# Patient Record
Sex: MALE | Race: WHITE | NOT HISPANIC OR LATINO | Employment: FULL TIME | URBAN - NONMETROPOLITAN AREA
[De-identification: names, ages, dates, MRNs, and addresses within clinical notes are randomized per-mention and may not be internally consistent; named-entity substitution may affect disease eponyms.]

---

## 2017-10-03 ENCOUNTER — TELEMEDICINE2 (OUTPATIENT)
Dept: MEDICAL GROUP | Facility: PHYSICIAN GROUP | Age: 67
End: 2017-10-03
Payer: MEDICARE

## 2017-10-03 ENCOUNTER — TELEMEDICINE ORIGINATING SITE VISIT (OUTPATIENT)
Dept: MEDICAL GROUP | Facility: CLINIC | Age: 67
End: 2017-10-03
Payer: MEDICARE

## 2017-10-03 ENCOUNTER — NON-PROVIDER VISIT (OUTPATIENT)
Dept: MEDICAL GROUP | Facility: CLINIC | Age: 67
End: 2017-10-03
Payer: MEDICARE

## 2017-10-03 VITALS
SYSTOLIC BLOOD PRESSURE: 156 MMHG | HEART RATE: 70 BPM | HEIGHT: 70 IN | RESPIRATION RATE: 16 BRPM | TEMPERATURE: 98.5 F | DIASTOLIC BLOOD PRESSURE: 88 MMHG | BODY MASS INDEX: 23.91 KG/M2 | WEIGHT: 167 LBS | OXYGEN SATURATION: 96 %

## 2017-10-03 DIAGNOSIS — N39.0 URINARY TRACT INFECTION WITH HEMATURIA, SITE UNSPECIFIED: ICD-10-CM

## 2017-10-03 DIAGNOSIS — R35.0 URINARY FREQUENCY: ICD-10-CM

## 2017-10-03 DIAGNOSIS — Z11.59 NEED FOR HEPATITIS C SCREENING TEST: ICD-10-CM

## 2017-10-03 DIAGNOSIS — M54.50 CHRONIC BILATERAL LOW BACK PAIN WITHOUT SCIATICA: ICD-10-CM

## 2017-10-03 DIAGNOSIS — G89.29 CHRONIC BILATERAL LOW BACK PAIN WITHOUT SCIATICA: ICD-10-CM

## 2017-10-03 DIAGNOSIS — R31.9 URINARY TRACT INFECTION WITH HEMATURIA, SITE UNSPECIFIED: ICD-10-CM

## 2017-10-03 DIAGNOSIS — Z12.5 SCREENING FOR PROSTATE CANCER: ICD-10-CM

## 2017-10-03 DIAGNOSIS — N30.01 ACUTE CYSTITIS WITH HEMATURIA: ICD-10-CM

## 2017-10-03 DIAGNOSIS — H54.40 BLIND RIGHT EYE: ICD-10-CM

## 2017-10-03 DIAGNOSIS — Z12.11 ENCOUNTER FOR FIT (FECAL IMMUNOCHEMICAL TEST) SCREENING: ICD-10-CM

## 2017-10-03 LAB
APPEARANCE UR: ABNORMAL
BILIRUB UR STRIP-MCNC: NEGATIVE MG/DL
COLOR UR AUTO: ABNORMAL
GLUCOSE UR STRIP.AUTO-MCNC: NEGATIVE MG/DL
KETONES UR STRIP.AUTO-MCNC: NEGATIVE MG/DL
LEUKOCYTE ESTERASE UR QL STRIP.AUTO: ABNORMAL
NITRITE UR QL STRIP.AUTO: POSITIVE
PH UR STRIP.AUTO: 6 [PH] (ref 5–8)
PROT UR QL STRIP: NEGATIVE MG/DL
RBC UR QL AUTO: ABNORMAL
SP GR UR STRIP.AUTO: 1.01
UROBILINOGEN UR STRIP-MCNC: 0.2 MG/DL

## 2017-10-03 PROCEDURE — 36415 COLL VENOUS BLD VENIPUNCTURE: CPT | Performed by: FAMILY MEDICINE

## 2017-10-03 PROCEDURE — 81002 URINALYSIS NONAUTO W/O SCOPE: CPT | Performed by: NURSE PRACTITIONER

## 2017-10-03 PROCEDURE — 99204 OFFICE O/P NEW MOD 45 MIN: CPT | Mod: GT | Performed by: NURSE PRACTITIONER

## 2017-10-03 RX ORDER — CIPROFLOXACIN 250 MG/1
250 TABLET, FILM COATED ORAL 2 TIMES DAILY
Qty: 20 TAB | Refills: 0 | Status: SHIPPED | OUTPATIENT
Start: 2017-10-03 | End: 2018-03-13

## 2017-10-03 ASSESSMENT — PATIENT HEALTH QUESTIONNAIRE - PHQ9: CLINICAL INTERPRETATION OF PHQ2 SCORE: 0
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
## 2017-10-03 NOTE — ASSESSMENT & PLAN NOTE
Patient states that he has been diagnosed with chronic low back pain secondary to a fractured tailbone and spondylolysis and arthritis. At one time he was taking heavy doses of over-the-counter medication which included aspirin and ibuprofen. He no longer takes any medicine but is truly affected by low back pain. 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 He does not self medicate. Although does have one or 2 beers at night after work. He does not smoke marijuana recreationally or medically.

## 2017-10-03 NOTE — PROGRESS NOTES
Isaias Fairchild is a 67 y.o. male here for a non-provider visit for Venipuncture    If abnormal was an in office provider notified upon receipt of results (if so, indicate provider)? Yes  Routed to PCP? Yes
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
## 2017-10-03 NOTE — ASSESSMENT & PLAN NOTE
This is a 67-year-old male who describes extreme urinary frequency, retention, and leakage for the last 6 months. No family history of prostate cancer or BPH that he is aware of. No hematuria. Sex drive is declined over time. No history of surgery or trauma to his pelvis. Long overdue for visit to healthcare.

## 2017-10-05 ENCOUNTER — HOSPITAL ENCOUNTER (OUTPATIENT)
Facility: MEDICAL CENTER | Age: 67
End: 2017-10-05
Attending: NURSE PRACTITIONER
Payer: MEDICARE

## 2017-10-05 ENCOUNTER — TELEPHONE (OUTPATIENT)
Dept: MEDICAL GROUP | Facility: PHYSICIAN GROUP | Age: 67
End: 2017-10-05

## 2017-10-05 PROBLEM — E03.8 OTHER SPECIFIED HYPOTHYROIDISM: Status: ACTIVE | Noted: 2017-10-05

## 2017-10-05 PROCEDURE — 82274 ASSAY TEST FOR BLOOD FECAL: CPT

## 2017-10-12 ENCOUNTER — TELEMEDICINE2 (OUTPATIENT)
Dept: MEDICAL GROUP | Facility: PHYSICIAN GROUP | Age: 67
End: 2017-10-12
Payer: MEDICARE

## 2017-10-12 ENCOUNTER — TELEMEDICINE ORIGINATING SITE VISIT (OUTPATIENT)
Dept: MEDICAL GROUP | Facility: CLINIC | Age: 67
End: 2017-10-12
Payer: MEDICARE

## 2017-10-12 VITALS
RESPIRATION RATE: 16 BRPM | HEART RATE: 66 BPM | BODY MASS INDEX: 23.91 KG/M2 | SYSTOLIC BLOOD PRESSURE: 134 MMHG | OXYGEN SATURATION: 98 % | DIASTOLIC BLOOD PRESSURE: 79 MMHG | HEIGHT: 70 IN | TEMPERATURE: 99 F | WEIGHT: 167 LBS

## 2017-10-12 DIAGNOSIS — E03.8 OTHER SPECIFIED HYPOTHYROIDISM: ICD-10-CM

## 2017-10-12 DIAGNOSIS — N30.01 ACUTE CYSTITIS WITH HEMATURIA: ICD-10-CM

## 2017-10-12 DIAGNOSIS — G89.29 CHRONIC BILATERAL LOW BACK PAIN WITHOUT SCIATICA: ICD-10-CM

## 2017-10-12 DIAGNOSIS — M54.50 CHRONIC BILATERAL LOW BACK PAIN WITHOUT SCIATICA: ICD-10-CM

## 2017-10-12 DIAGNOSIS — E55.9 VITAMIN D INSUFFICIENCY: ICD-10-CM

## 2017-10-12 DIAGNOSIS — R35.0 URINARY FREQUENCY: ICD-10-CM

## 2017-10-12 DIAGNOSIS — N28.9 FUNCTION KIDNEY DECREASED: ICD-10-CM

## 2017-10-12 PROCEDURE — 99213 OFFICE O/P EST LOW 20 MIN: CPT | Mod: GT | Performed by: NURSE PRACTITIONER

## 2017-10-12 RX ORDER — LEVOTHYROXINE SODIUM 0.03 MG/1
25 TABLET ORAL
Qty: 30 TAB | Refills: 11 | Status: SHIPPED | OUTPATIENT
Start: 2017-10-12 | End: 2018-01-09 | Stop reason: SDUPTHER

## 2017-10-12 NOTE — ASSESSMENT & PLAN NOTE
Patient has TSH drawn: 6.32 new diagnosis. Patient was counseled on what hypothyroidism is. Potential symptoms. Treatment and follow-up evaluation. Importance of keeping this part of his his health record

## 2017-10-12 NOTE — ASSESSMENT & PLAN NOTE
Patient recently treated for severe UTI. He still has nocturia and some frequency was noted that his GFR was decreased in creatinine at 1.42. I suspect this was during the time he had the severe infection will need to recheck this.

## 2017-10-12 NOTE — ASSESSMENT & PLAN NOTE
Patient is just about finished with his antibiotic and feeling much better but still has nocturia.

## 2017-10-12 NOTE — ASSESSMENT & PLAN NOTE
Patient states that his urine stream is improved. Patient still has nocturia. Patient found a urologist at [place] but it will be 2-3 months before he can be seen. However there is a urologist that is closer in to town that he could be seen much sooner. I've asked the patient to contact our referral team and get this switch so he is able to see the one that we'll give him a sooner appointment. PSA was normal.

## 2017-10-12 NOTE — PROGRESS NOTES
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  Secured video conference with RN presenter in [place], [place]        Other specified hypothyroidism  Patient has TSH drawn: 6.32 new diagnosis. Patient was counseled on what hypothyroidism is. Potential symptoms. Treatment and follow-up evaluation. Importance of keeping this part of his his health record    Urinary frequency  Patient states that his urine stream is improved. Patient still has nocturia. Patient found a urologist at [place] but it will be 2-3 months before he can be seen. However there is a urologist that is closer in to town that he could be seen much sooner. I've asked the patient to contact our referral team and get this switch so he is able to see the one that we'll give him a sooner appointment. PSA was normal.    Vitamin D insufficiency  Patient to obtain D3 over-the-counter 2000 units daily we'll recheck in 2-3 months    Acute cystitis with hematuria  Patient is just about finished with his antibiotic and feeling much better but still has nocturia.    Function kidney decreased  Patient recently treated for severe UTI. He still has nocturia and some frequency was noted that his GFR was decreased in creatinine at 1.42. I suspect this was during the time he had the severe infection will need to recheck this.        Allergies:Review of patient's allergies indicates no known allergies.    Current Outpatient Prescriptions Ordered in [facility]   Medication Sig Dispense Refill   • levothyroxine (SYNTHROID) 25 MCG Tab Take 1 Tab by mouth Every morning on an empty stomach. 30 Tab 11   • ciprofloxacin (CIPRO) 250 MG Tab Take 1 Tab by mouth 2 times a day. 20 Tab 0     No current Epic-ordered facility-administered medications on file.        No past medical history on file.    Social History  "  Substance Use Topics   • Smoking status: Former Smoker     Packs/day: 0.50     Years: 40.00     Quit date: 10/3/2015   • Smokeless tobacco: Never Used   • Alcohol use 8.4 oz/week     14 Cans of beer per week       Family Status   Relation Status   • Mother    • Father      Family History   Problem Relation Age of Onset   • Heart Disease Mother        ROS: As documented in my HPI      Exam:  Blood pressure 134/79, pulse 66, temperature 37.2 °C (99 °F), resp. rate 16, height 1.778 m (5' 10\"), weight 75.8 kg (167 lb), SpO2 98 %.  General:  Well nourished, well developed male in NAD  Head: No lesions, Non tender scalp  Neck: Supple.   Pulmonary:  Normal effort.   Cardiovascular: Regular rate   Psych: Alert and oriented x3. Normal mood and affect.   Neurological: No focal deficits    Please note that this dictation was created using voice recognition software. I have made every reasonable attempt to correct obvious errors, but I expect that there are errors of grammar and possibly content that I did not discover before finalizing the note.    Assessment/Plan:  1. Other specified hypothyroidism  TSH+FREE T4  This is a new problem patient to start levothyroxine. We discussed how he needs to take the medication and follow-up with lab work.     levothyroxine (SYNTHROID) 25 MCG Tab  TSH and Free T4.    2. Vitamin D insufficiency   Over-the-counter supplementation D3 2000 units    3. Urinary frequency   Keep appointment with urology    4. Acute cystitis with hematuria   Resolving    5. Function kidney decreased  COMP METABOLIC PANEL          "

## 2017-10-13 ENCOUNTER — TELEPHONE (OUTPATIENT)
Dept: MEDICAL GROUP | Facility: PHYSICIAN GROUP | Age: 67
End: 2017-10-13

## 2017-10-13 DIAGNOSIS — Z12.11 ENCOUNTER FOR FIT (FECAL IMMUNOCHEMICAL TEST) SCREENING: ICD-10-CM

## 2017-10-13 LAB — HEMOCCULT STL QL IA: NEGATIVE

## 2017-10-13 NOTE — LETTER
October 13, 2017        Isaias BUNN [facility] [address]  [place] [address]        Dear Isaias:    FIT test or stool for blood was negative.      If you have any questions or concerns, please don't hesitate to call.        Sincerely,        JUSTICE Evans.    Electronically Signed

## 2017-12-12 ENCOUNTER — NON-PROVIDER VISIT (OUTPATIENT)
Dept: MEDICAL GROUP | Facility: CLINIC | Age: 67
End: 2017-12-12
Payer: MEDICARE

## 2017-12-12 DIAGNOSIS — N28.9 FUNCTION KIDNEY DECREASED: ICD-10-CM

## 2017-12-12 PROCEDURE — 36415 COLL VENOUS BLD VENIPUNCTURE: CPT | Performed by: NURSE PRACTITIONER

## 2017-12-13 ENCOUNTER — TELEPHONE (OUTPATIENT)
Dept: MEDICAL GROUP | Facility: PHYSICIAN GROUP | Age: 67
End: 2017-12-13

## 2017-12-13 DIAGNOSIS — R73.09 ELEVATED RANDOM BLOOD GLUCOSE LEVEL: ICD-10-CM

## 2017-12-13 DIAGNOSIS — R79.89 ELEVATED TSH: ICD-10-CM

## 2017-12-13 NOTE — TELEPHONE ENCOUNTER
Please call patient. I reviewed his labs he does have some changes that we can discuss in January. I encouraged him to decrease sugar in his diet, increase fluids for his kidneys. He has a slight change in thyroid but we still can discuss this in January. Or he is able to make an appointment sooner if he would like to discuss.

## 2018-01-09 ENCOUNTER — TELEMEDICINE ORIGINATING SITE VISIT (OUTPATIENT)
Dept: MEDICAL GROUP | Facility: CLINIC | Age: 68
End: 2018-01-09
Payer: MEDICARE

## 2018-01-09 ENCOUNTER — TELEMEDICINE2 (OUTPATIENT)
Dept: MEDICAL GROUP | Facility: PHYSICIAN GROUP | Age: 68
End: 2018-01-09
Payer: MEDICARE

## 2018-01-09 VITALS
DIASTOLIC BLOOD PRESSURE: 76 MMHG | RESPIRATION RATE: 16 BRPM | TEMPERATURE: 96.8 F | WEIGHT: 167 LBS | OXYGEN SATURATION: 91 % | HEART RATE: 69 BPM | HEIGHT: 70 IN | SYSTOLIC BLOOD PRESSURE: 123 MMHG | BODY MASS INDEX: 23.91 KG/M2

## 2018-01-09 DIAGNOSIS — E55.9 VITAMIN D INSUFFICIENCY: ICD-10-CM

## 2018-01-09 DIAGNOSIS — R73.09 ELEVATED RANDOM BLOOD GLUCOSE LEVEL: ICD-10-CM

## 2018-01-09 DIAGNOSIS — N28.9 FUNCTION KIDNEY DECREASED: ICD-10-CM

## 2018-01-09 DIAGNOSIS — R79.89 ELEVATED TSH: ICD-10-CM

## 2018-01-09 DIAGNOSIS — N30.01 ACUTE CYSTITIS WITH HEMATURIA: ICD-10-CM

## 2018-01-09 DIAGNOSIS — N39.46 MIXED STRESS AND URGE URINARY INCONTINENCE: ICD-10-CM

## 2018-01-09 DIAGNOSIS — E03.8 OTHER SPECIFIED HYPOTHYROIDISM: ICD-10-CM

## 2018-01-09 PROBLEM — R35.0 URINARY FREQUENCY: Status: RESOLVED | Noted: 2017-10-03 | Resolved: 2018-01-09

## 2018-01-09 PROCEDURE — 99214 OFFICE O/P EST MOD 30 MIN: CPT | Mod: GT | Performed by: NURSE PRACTITIONER

## 2018-01-09 RX ORDER — LEVOTHYROXINE SODIUM 0.03 MG/1
25 TABLET ORAL
Qty: 90 TAB | Refills: 0 | Status: SHIPPED | OUTPATIENT
Start: 2018-01-09 | End: 2018-03-13 | Stop reason: SDUPTHER

## 2018-01-09 NOTE — ASSESSMENT & PLAN NOTE
Patient had increased blood sugar. Dietary intervention discussed.  Will recheck hgba1c in March.

## 2018-01-09 NOTE — ASSESSMENT & PLAN NOTE
Patient is doing quite well. No burning. No visible blood in urine. Changing out catheters without problems.  Saw urology and is in care.

## 2018-01-09 NOTE — ASSESSMENT & PLAN NOTE
Patient is a salt user on every food. Even fruit. I have asked to discontinue.  Most recent Gfr is 51. Creatinine is 1.43.

## 2018-01-09 NOTE — PATIENT INSTRUCTIONS
1. AVOID SALT    2. Increase water daily    3. Recheck thyroid test in early March.    4. Return clinic in 6 months.    5. Follow up with UROLOGY.

## 2018-01-09 NOTE — PROGRESS NOTES
Chief Complaint   Patient presents with   • Follow-Up       HISTORY OF PRESENT ILLNESS: Patient is a 67 y.o. male [facility] established patient, who presents today to discuss:   Verified Identification with patient.  Secured video conference with RN presenter in [place], [place]        Other specified hypothyroidism  Patient had recent TSH at 5.90 Patient is on 25 mcg, will refill and recheck. Asymptomatic.     Vitamin D insufficiency  Patient will start Vitamin D over the counter. 2,000 units daily.    Mixed stress and urge urinary incontinence  Patient went to [place] to be seen in URO.  Change out catheter's 3 times a day. Patient actually is urinating on his own sometimes during the day. He feels more freedom with this new system and is needing to go back to the urologist.    Elevated random blood glucose level  Patient had increased blood sugar. Dietary intervention discussed.  Will recheck hgba1c in March.     Acute cystitis with hematuria  Patient is doing quite well. No burning. No visible blood in urine. Changing out catheters without problems.  Saw urology and is in care.     Function kidney decreased  Patient is a salt user on every food. Even fruit. I have asked to discontinue.  Most recent Gfr is 51. Creatinine is 1.43.        Allergies:Patient has no known allergies.    Current Outpatient Prescriptions Ordered in [facility]   Medication Sig Dispense Refill   • levothyroxine (SYNTHROID) 25 MCG Tab Take 1 Tab by mouth Every morning on an empty stomach. 90 Tab 0   • ciprofloxacin (CIPRO) 250 MG Tab Take 1 Tab by mouth 2 times a day. 20 Tab 0     No current Epic-ordered facility-administered medications on file.        Past Medical History:   Diagnosis Date   • Thyroid disease        Social History   Substance Use Topics   • Smoking status: Former Smoker     Packs/day: 0.50     Years: 40.00     Quit date: 10/3/2015   • Smokeless tobacco: Never Used   • Alcohol use 8.4 oz/week     14 Cans of beer per week  "      Family Status   Relation Status   • Mother    • Father      Family History   Problem Relation Age of Onset   • Heart Disease Mother        ROS: As documented in my HPI      Exam:  Blood pressure 123/76, pulse 69, temperature 36 °C (96.8 °F), resp. rate 16, height 1.778 m (5' 10\"), weight 75.8 kg (167 lb), SpO2 91 %.  General:  Well nourished, well developed male in NAD  Neck: Supple.   Pulmonary:  Normal effort.   Cardiovascular: Regular rate and rhythm  Psych: Alert and oriented x3. Normal mood and affect.   Neurological: No focal deficits      Please note that this dictation was created using voice recognition software. I have made every reasonable attempt to correct obvious errors, but I expect that there are errors of grammar and possibly content that I did not discover before finalizing the note.    Assessment/Plan:  1. Other specified hypothyroidism  TSH+FREE T4 Not quite controlled we'll continue on 25 µg recheck labs in a month     levothyroxine (SYNTHROID) 25 MCG Tab   2. Vitamin D insufficiency   Start supplementation with 2000 units a day over-the-counter    3. Mixed stress and urge urinary incontinence   Follow-up with urology in [place]    4. Elevated random blood glucose level  HEMOGLOBIN A1C   5. Acute cystitis with hematuria   Stable and controlled    6. Function kidney decreased   Patient to discontinue salt usage. Will recheck next visit.        Will notify of results if abnormal patient will have to come in otherwise will manage with message to patient.    "

## 2018-01-31 ENCOUNTER — TELEPHONE (OUTPATIENT)
Dept: MEDICAL GROUP | Facility: PHYSICIAN GROUP | Age: 68
End: 2018-01-31

## 2018-02-06 ENCOUNTER — TELEPHONE (OUTPATIENT)
Dept: MEDICAL GROUP | Facility: PHYSICIAN GROUP | Age: 68
End: 2018-02-06

## 2018-02-06 NOTE — TELEPHONE ENCOUNTER
Patient has appointment next month. Before he come in, I recommend he start vitamin D supplements. 2,000 units daily.  Hydrate for kidney function.  Tena Lau

## 2018-02-06 NOTE — TELEPHONE ENCOUNTER
Pt started taking Vit D supplement after last appointment. Pt has cut his salt intake drastically and he has increased his water intake daily.

## 2018-03-13 ENCOUNTER — TELEMEDICINE ORIGINATING SITE VISIT (OUTPATIENT)
Dept: MEDICAL GROUP | Facility: CLINIC | Age: 68
End: 2018-03-13
Payer: MEDICARE

## 2018-03-13 ENCOUNTER — TELEMEDICINE2 (OUTPATIENT)
Dept: MEDICAL GROUP | Facility: PHYSICIAN GROUP | Age: 68
End: 2018-03-13
Payer: MEDICARE

## 2018-03-13 VITALS
HEIGHT: 70 IN | TEMPERATURE: 97.2 F | SYSTOLIC BLOOD PRESSURE: 137 MMHG | RESPIRATION RATE: 16 BRPM | DIASTOLIC BLOOD PRESSURE: 77 MMHG | HEART RATE: 60 BPM | WEIGHT: 167 LBS | BODY MASS INDEX: 23.91 KG/M2 | OXYGEN SATURATION: 98 %

## 2018-03-13 DIAGNOSIS — N39.46 MIXED STRESS AND URGE URINARY INCONTINENCE: ICD-10-CM

## 2018-03-13 DIAGNOSIS — E55.9 VITAMIN D INSUFFICIENCY: ICD-10-CM

## 2018-03-13 DIAGNOSIS — R73.09 ELEVATED RANDOM BLOOD GLUCOSE LEVEL: ICD-10-CM

## 2018-03-13 DIAGNOSIS — E03.8 OTHER SPECIFIED HYPOTHYROIDISM: ICD-10-CM

## 2018-03-13 PROCEDURE — 99213 OFFICE O/P EST LOW 20 MIN: CPT | Mod: GT | Performed by: NURSE PRACTITIONER

## 2018-03-13 RX ORDER — LEVOTHYROXINE SODIUM 0.03 MG/1
25 TABLET ORAL
Qty: 90 TAB | Refills: 0 | Status: SHIPPED | OUTPATIENT
Start: 2018-03-13 | End: 2018-05-29

## 2018-03-13 ASSESSMENT — PATIENT HEALTH QUESTIONNAIRE - PHQ9: CLINICAL INTERPRETATION OF PHQ2 SCORE: 0

## 2018-03-13 NOTE — ASSESSMENT & PLAN NOTE
Chronic problem and mostly controlled   Last TSH 1/31 elevated  Symptoms  none  Current dose 25 mcg   Do you take your medication correctly?  Yes    Recheck labs.

## 2018-03-13 NOTE — PROGRESS NOTES
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  Secured video conference with RN presenter in [place], [place]        Mixed stress and urge urinary incontinence  Patient continues to self catheterization as needed. No pressure. No urgency . No dysuria. No fever. No chills.     Other specified hypothyroidism  Chronic problem and mostly controlled   Last TSH  elevated  Symptoms  none  Current dose 25 mcg   Do you take your medication correctly?  Yes    Recheck labs.     Vitamin D insufficiency  Patient is taking 1,000 units daily. Need to recheck.         Allergies:Patient has no known allergies.    Current Outpatient Prescriptions Ordered in [facility]   Medication Sig Dispense Refill   • vitamin D (CHOLECALCIFEROL) 1000 UNIT Tab Take 1,000 Units by mouth every day.     • levothyroxine (SYNTHROID) 25 MCG Tab Take 1 Tab by mouth Every morning on an empty stomach. 90 Tab 0     No current Epic-ordered facility-administered medications on file.        Past Medical History:   Diagnosis Date   • Thyroid disease        Social History   Substance Use Topics   • Smoking status: Former Smoker     Packs/day: 0.50     Years: 40.00     Quit date: 10/3/2015   • Smokeless tobacco: Never Used   • Alcohol use 8.4 oz/week     14 Cans of beer per week       Family Status   Relation Status   • Mother    • Father      Family History   Problem Relation Age of Onset   • Heart Disease Mother        Review of Systems:  Allergic/Immunologic : Denies persistent infections  Cardiovascular: Denies chest pain or irregular heartbeat and palpitations, syncope  Constitutional: Denies fever, fatigue, loss of appetite, weight gain, weight loss.  ENMT: Denies nosebleeds sore throat postnasal drip choking episodes.   Endocrine: Denies excessive thirst, hair loss, heat intolerance  Eyes:  "Denies yellow discoloration or visual changes  Gastrointestinal: Denies heartburn dysphasia abdominal pain, nausea, vomiting, abdominal swelling, change in bowel habits, constipation, diarrhea, fecal incontinence, rectal bleeding, gas, or jaundice  Genitourinary denies dark urine, dysuria, any dysuria  Hematologic/lymphatic denies easy bruising or prolonged bleeding  Musculoskeletal: Denies joint pain, muscle weakness daily use of over-the-counter medications for pain or prescription medications for pain  Psychiatry: Denies anxiety or depression or treatment for mental health disorder  Respiratory: Denies cough, dyspnea, hemoptysis, or wheezing        Exam:  Blood pressure 137/77, pulse 60, temperature 36.2 °C (97.2 °F), resp. rate 16, height 1.778 m (5' 10\"), weight 75.8 kg (167 lb), SpO2 98 %.  General:  Well nourished, well developed male in NAD  Communication: Conversation is appropriate  Neck: Supple. Full range of motion is present  Thyroid: symmetric   Respiratory: Effort-normal respiratory effort  Cardiovascular: Regular rate and rhythm without murmur. S1-S2 heard  Psych: Alert and oriented x3. Normal mood and affect.   SKIN: No rashes, ulcers or icterus  Neurological: No focal deficits      Please note that this dictation was created using voice recognition software. I have made every reasonable attempt to correct obvious errors, but I expect that there are errors of grammar and possibly content that I did not discover before finalizing the note.    Assessment/Plan:  1. Mixed stress and urge urinary incontinence   Second referral to UROLOGY   2. Other specified hypothyroidism     Current Outpatient Prescriptions:   •  vitamin D, 1,000 Units, Oral, DAILY, 3/13/2018  •  levothyroxine, 25 mcg, Oral, AM ES     3. Vitamin D insufficiency  Current status of condition is chronic and controlled on therapy.  Recheck with labs          Recheck CMP, TSH, Vitamin D.    "

## 2018-03-13 NOTE — ASSESSMENT & PLAN NOTE
Patient continues to self catheterization as needed. No pressure. No urgency . No dysuria. No fever. No chills.

## 2018-03-19 ENCOUNTER — NON-PROVIDER VISIT (OUTPATIENT)
Dept: MEDICAL GROUP | Facility: CLINIC | Age: 68
End: 2018-03-19
Payer: MEDICARE

## 2018-03-19 DIAGNOSIS — R73.09 ELEVATED RANDOM BLOOD GLUCOSE LEVEL: ICD-10-CM

## 2018-03-19 PROCEDURE — 36415 COLL VENOUS BLD VENIPUNCTURE: CPT | Performed by: NURSE PRACTITIONER

## 2018-03-19 NOTE — NON-PROVIDER
Isaias Fairchild is a 67 y.o. male here for a non-provider visit for venipuncture.    If abnormal was an in office provider notified today (if so, indicate provider)? Yes  Routed to PCP? Yes

## 2018-03-21 ENCOUNTER — TELEPHONE (OUTPATIENT)
Dept: MEDICAL GROUP | Facility: PHYSICIAN GROUP | Age: 68
End: 2018-03-21

## 2018-03-21 DIAGNOSIS — E03.8 OTHER SPECIFIED HYPOTHYROIDISM: ICD-10-CM

## 2018-03-21 RX ORDER — LEVOTHYROXINE SODIUM 0.05 MG/1
50 TABLET ORAL
Qty: 30 TAB | Refills: 3 | Status: SHIPPED | OUTPATIENT
Start: 2018-03-21 | End: 2018-05-29

## 2018-03-21 NOTE — TELEPHONE ENCOUNTER
Patient continue to have abnormal TSH. I will need to increase dose to 50 mcg daily. Please call patient and confirm he knows how to take medication and the need to increase. Recheck in 2-3 months.  Tena Lau

## 2018-04-03 ENCOUNTER — APPOINTMENT (OUTPATIENT)
Dept: RADIOLOGY | Facility: IMAGING CENTER | Age: 68
End: 2018-04-03
Attending: PREVENTIVE MEDICINE
Payer: COMMERCIAL

## 2018-04-03 ENCOUNTER — OCCUPATIONAL MEDICINE (OUTPATIENT)
Dept: OCCUPATIONAL MEDICINE | Facility: CLINIC | Age: 68
End: 2018-04-03
Payer: COMMERCIAL

## 2018-04-03 ENCOUNTER — NON-PROVIDER VISIT (OUTPATIENT)
Dept: MEDICAL GROUP | Facility: CLINIC | Age: 68
End: 2018-04-03
Payer: COMMERCIAL

## 2018-04-03 VITALS
TEMPERATURE: 98.1 F | OXYGEN SATURATION: 94 % | BODY MASS INDEX: 24.62 KG/M2 | HEIGHT: 70 IN | WEIGHT: 172 LBS | RESPIRATION RATE: 16 BRPM | HEART RATE: 60 BPM | SYSTOLIC BLOOD PRESSURE: 159 MMHG | DIASTOLIC BLOOD PRESSURE: 75 MMHG

## 2018-04-03 DIAGNOSIS — S40.012A CONTUSION OF LEFT SHOULDER, INITIAL ENCOUNTER: ICD-10-CM

## 2018-04-03 DIAGNOSIS — S40.012D CONTUSION OF LEFT SHOULDER, SUBSEQUENT ENCOUNTER: ICD-10-CM

## 2018-04-03 PROCEDURE — 99203 OFFICE O/P NEW LOW 30 MIN: CPT | Performed by: PREVENTIVE MEDICINE

## 2018-04-03 PROCEDURE — 73030 X-RAY EXAM OF SHOULDER: CPT | Mod: TC,LT | Performed by: PREVENTIVE MEDICINE

## 2018-04-03 RX ORDER — DICLOFENAC SODIUM 75 MG/1
75 TABLET, DELAYED RELEASE ORAL 2 TIMES DAILY
Qty: 60 TAB | Refills: 1 | Status: SHIPPED | OUTPATIENT
Start: 2018-04-03

## 2018-04-03 RX ORDER — PREDNISONE 20 MG/1
20 TABLET ORAL 2 TIMES DAILY
Qty: 14 TAB | Refills: 0 | Status: SHIPPED | OUTPATIENT
Start: 2018-04-03 | End: 2018-04-17

## 2018-04-03 ASSESSMENT — PAIN SCALES - GENERAL: PAINLEVEL: 7=MODERATE-SEVERE PAIN

## 2018-04-03 NOTE — LETTER
"EMPLOYEE’S CLAIM FOR COMPENSATION/ REPORT OF INITIAL TREATMENT  FORM C-4    EMPLOYEE’S CLAIM - PROVIDE ALL INFORMATION REQUESTED   First Name  Isaias Last Name  Gurinder Birthdate                    1950                Sex  male Claim Number   Home Address  [address] Age  67 y.o. Height  1.778 m (5' 10\") Weight  78 kg (172 lb) [facility]     [facility] Zip  [address] Telephone  [phone] (home)    Mailing Address  [address] [facility] [place] Zip  [address] Primary Language Spoken  English    Insurer  unknown Third Party    Johann   Employee's Occupation (Job Title) When Injury or Occupational Disease Occurred  Unknown    Employer's Name     Telephone      Employer Address  [address]  [facility]      Date of Injury  3/22/2018               Hour of Injury   Date Employer Notified   Last Day of Work after Injury or Occupational Disease   Supervisor to Whom Injury Reported     Address or Location of Accident (if applicable)     What were you doing at the time of accident? (if applicable)      How did this injury or occupational disease occur? (Be specific an answer in detail. Use additional sheet if necessary)     If you believe that you have an occupational disease, when did you first have knowledge of the disability and it relationship to your employment?   Witnesses to the Accident        Nature of Injury or Occupational Disease    Part(s) of Body Injured or Affected  , ,     I certify that the above is true and correct to the best of my knowledge and that I have provided this information in order to obtain the benefits of [place]  I hereby authorize any physician, chiropractor, surgeon, practitioner, or other person, any hospital, including [facility] or " [facility], any medical service organization, any insurance company, or other institution or organization to release to each other, any medical or other information, including benefits paid or payable, pertinent to this injury or disease, except information relative to diagnosis, treatment and/or counseling for AIDS, psychological conditions, alcohol or controlled substances, for which I must give specific authorization.  A Photostat of this authorization shall be as valid as the original.     Date   Place   Employee’s Signature   THIS REPORT MUST BE COMPLETED AND MAILED WITHIN 3 WORKING DAYS OF TREATMENT   Place  [facility]  Name of Facility  [facility]   Date  4/3/2018 Diagnosis  (S40.012A) Contusion of left shoulder, initial encounter Is there evidence the injured employee was under the influence of alcohol and/or another controlled substance at the time of accident?   Hour  7:53 AM Description of Injury or Disease  The encounter diagnosis was Contusion of left shoulder, initial encounter. No   Treatment  Prednisone, diclofenac, physical therapy  Have you advised the patient to remain off work five days or more?     X-Ray Findings    Comments:pending   If Yes   From Date  To Date      From information given by the employee, together with medical evidence, can you directly connect this injury or occupational disease as job incurred?    Comments:indeterminate pending further diagnostic studies If No Full Duty  No Modified Duty  Yes   Is additional medical care by a physician indicated?  Yes If Modified Duty, Specify any Limitations / Restrictions  Limited over the shoulder work. Limited lifting   Do you know of any previous injury or disease contributing to this condition or occupational disease?                            No   Date  4/3/2018 Print Doctor’s Name Walt Mejía M.D. I certify the employer’s copy of  this form was mailed on:   Address  [address] [facility],   [address]  "Insurer’s Use Only     [place] Zip  [address]    Provider’s Tax ID Number  [id number] Telephone  Dept: [phone]        iona-NENA Richardson M.D.   e-Signature: Dr. Franky Shelby, Medical Director Degree  MD        ORIGINAL-TREATING PHYSICIAN OR CHIROPRACTOR    PAGE 2-INSURER/TPA    PAGE 3-EMPLOYER    PAGE 4-EMPLOYEE             Form C-4 (rev10/07)              BRIEF DESCRIPTION OF RIGHTS AND BENEFITS  (Pursuant to [place] 616C.050)    Notice of Injury or Occupational Disease (Incident Report Form C-1): If an injury or occupational disease (OD) arises out of and in the  course of employment, you must provide written notice to your employer as soon as practicable, but no later than 7 days after the accident or  OD. Your employer shall maintain a sufficient supply of the required forms.    Claim for Compensation (Form C-4): If medical treatment is sought, the form C-4 is available at the place of initial treatment. A completed  \"Claim for Compensation\" (Form C-4) must be filed within 90 days after an accident or OD. The treating physician or chiropractor must,  within 3 working days after treatment, complete and mail to the employer, the employer's insurer and third-party , the Claim for  Compensation.    Medical Treatment: If you require medical treatment for your on-the-job injury or OD, you may be required to select a physician or  chiropractor from a list provided by your workers’ compensation insurer, if it has contracted with an Organization for Managed Care (MCO) or  Preferred Provider Organization (PPO) or providers of health care. If your employer has not entered into a contract with an MCO or PPO, you  may select a physician or chiropractor from the Panel of Physicians and Chiropractors. Any medical costs related to your industrial injury or  OD will be paid by your insurer.    Temporary Total Disability (TTD): If your doctor has certified that you are unable to work for a " period of at least 5 consecutive days, or 5  cumulative days in a 20-day period, or places restrictions on you that your employer does not accommodate, you may be entitled to TTD  compensation.    Temporary Partial Disability (TPD): If the wage you receive upon reemployment is less than the compensation for TTD to which you are  entitled, the insurer may be required to pay you TPD compensation to make up the difference. TPD can only be paid for a maximum of 24  months.    Permanent Partial Disability (PPD): When your medical condition is stable and there is an indication of a PPD as a result of your injury or  OD, within 30 days, your insurer must arrange for an evaluation by a rating physician or chiropractor to determine the degree of your PPD. The  amount of your PPD award depends on the date of injury, the results of the PPD evaluation and your age and wage.    Permanent Total Disability (PTD): If you are medically certified by a treating physician or chiropractor as permanently and totally disabled  and have been granted a PTD status by your insurer, you are entitled to receive monthly benefits not to exceed 66 2/3% of your average  monthly wage. The amount of your PTD payments is subject to reduction if you previously received a PPD award.    Vocational Rehabilitation Services: You may be eligible for vocational rehabilitation services if you are unable to return to the job due to a  permanent physical impairment or permanent restrictions as a result of your injury or occupational disease.    Transportation and Per Radha Reimbursement: You may be eligible for travel expenses and per radha associated with medical treatment.    Reopening: You may be able to reopen your claim if your condition worsens after claim closure.    Appeal Process: If you disagree with a written determination issued by the insurer or the insurer does not respond to your request, you may  appeal to the Department of Administration, Hearing  Officer, by following the instructions contained in your determination letter. You must  appeal the determination within 70 days from the date of the determination letter at [address], [address], [place], [place]  [address], or [address], [address], [place], [place] [address]. If you disagree with the  decision, you may appeal to the  Department of Administration, . You must file your appeal within 30 days from the date of the  decision  letter at [address], [address], [place], [place] [address], or [address], [address], [place], [place] [address]. If you  disagree with a decision of an , you may file a petition for judicial review with the District Court. You must do so within 30  days of the Appeal Officer’s decision. You may be represented by an  at your own expense or you may contact the [facility] for possible  representation.    [place]  for Injured Workers (NAIW): If you disagree with a  decision, you may request that NAIW represent you  without charge at an  Hearing. For information regarding denial of benefits, you may contact the [facility] at: [address], [address], [place] [address], [phone], or [address], [address], [place] [address], [phone]    To File a Complaint with the Division: If you wish to file a complaint with the  of the Division of Industrial Relations (DIR),  please contact the Workers’ Compensation Section, [address], [address], [place], [place] [address], telephone [phone]) [phone], or  [address] [facility] [address], [place] [address], telephone [phone].    For assistance with Workers’ Compensation Issues: you may contact the Office of the [facility] Consumer Health Assistance, [address], [address], [place], [place] [address], Toll Free 1-263.959.6990, Web  site: http://jean claude..[place].us, E-mail  Kerrie@.nv.                                                                                                                                                                                                                                   __________________________________________________________________                                                                   _________________                Employee Name / Signature                                                                                                                                                       Date                                                                                                                                                                                                     D-2 (rev. 10/07)

## 2018-04-03 NOTE — LETTER
[address],   [address] [address]  Phone:  [phone] - Fax:  [phone]   Occupational Health [facility] Progress Report and Disability Certification  Date of Service: 4/3/2018   No Show:  No  Date / Time of Next Visit: 4/26/2018   Claim Information   Patient Name: Isaias Fairchild  Claim Number:     Employer:   [employer] Date of Injury: 3/22/2018     Insurer / TPA:   Johann ID / SSN:     Occupation: Mining Diagnosis: The encounter diagnosis was Contusion of left shoulder, initial encounter.    Medical Information   Related to Industrial Injury?   Comments:Indeterminate pending further studies    Subjective Complaints:  Date of incident 3/22/2018. Incident-lifting 5 gallon bucket. 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 He reports an incident a few months ago where he apparently struck his left shoulder with a piece of equipment. He reported that but did not seek medical attention. About 2 weeks ago, he was lifting 5 gallon buckets and felt a pop in the left shoulder. He complains of anterior left shoulder pain which is worse with certain movements. No neck symptoms. No  past history of left shoulder pain. No numbness or weakness.   Objective Findings: Appearance: Well-developed, well-nourished.   Mental Status: Mood and Affect normal. Pleasant. Cooperative. Appropriate.   ENT: Oropharynx clear. Moist mucous membranes. Hearing normal.   Eyes: Pupils reactive. Conjunctiva normal. No scleral icterus.   Neck: Grossly normal good range of motion. No neck tenderness.  Cardiovascular: Normal rate. Regular rhythm. Normal heart sounds.   Chest: Effort normal. Breath sounds clear.   Musculoskeletal: Left shoulder has tenderness over the clavicle which appears near the acromioclavicular joint. Pain on motion in all planes. Positive scratch test. Positive Pelletier.     Pre-Existing Condition(s):     Assessment:   Initial Visit    Status:  Additional Care Required  Permanent Disability:No    Plan: MedicationPT    Diagnostics: X-ray    Comments:       Disability Information   Status: Released to Restricted Duty    From:  4/3/2018  Through: 4/26/2018 Restrictions are: Temporary   Physical Restrictions   Sitting:    Standing:    Stooping:    Bending:      Squatting:    Walking:    Climbing:    Pushing:      Pulling:    Other:    Reaching Above Shoulder (L): < or = to 1 hr/day Reaching Above Shoulder (R):       Reaching Below Shoulder (L):    Reaching Below Shoulder (R):      Not to exceed Weight Limits   Carrying(hrs):   Weight Limit(lb):   Lifting(hrs):   Weight  Limit(lb): < or = to 25 pounds   Comments:      Repetitive Actions   Hands: i.e. Fine Manipulations from Grasping:     Feet: i.e. Operating Foot Controls:     Driving / Operate Machinery:     Physician Name: Walt Mejía M.D. Physician Signature: WALT Tate M.D. e-Signature: Dr. Frnaky Shelby, Medical Director   Clinic Name / Location: [address],   [address] [address] Clinic Phone Number: Dept: [phone]   Appointment Time: 8:00 Am Visit Start Time: 7:53 AM   Check-In Time:  7:50 Am Visit Discharge Time:  0845   Original-Treating Physician or Chiropractor    Page 2-Insurer/TPA    Page 3-Employer    Page 4-Employee

## 2018-04-03 NOTE — LETTER
[address],   [address] [address]  Phone:  [phone] - Fax:  [phone]   Occupational Health [facility] Progress Report and Disability Certification  Date of Service: 4/3/2018   No Show:  No  Date / Time of Next Visit: 4/26/2018   Claim Information   Patient Name: Isaias Fairchild  Claim Number:     Employer:  [employer] Date of Injury: 3/22/2018     Insurer / TPA:   Johann ID / SSN:     Occupation: Mining Diagnosis: The encounter diagnosis was Contusion of left shoulder, initial encounter.    Medical Information   Related to Industrial Injury?   Comments:Indeterminate pending further studies    Subjective Complaints:  Date of incident 3/22/2018. Incident-lifting 5 gallon bucket. 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 He reports an incident a few months ago where he apparently struck his left shoulder with a piece of equipment. He reported that but did not seek medical attention. About 2 weeks ago, he was lifting 5 gallon buckets and felt a pop in the left shoulder. He complains of anterior left shoulder pain which is worse with certain movements. No neck symptoms. No  past history of left shoulder pain. No numbness or weakness.   Objective Findings: Appearance: Well-developed, well-nourished.   Mental Status: Mood and Affect normal. Pleasant. Cooperative. Appropriate.   Neck: Grossly normal good range of motion. No neck tenderness.  Cardiovascular: Normal rate. Regular rhythm. Normal heart sounds.   Chest: Effort normal. Breath sounds clear.   Musculoskeletal: Left shoulder has tenderness over the clavicle which appears near the acromioclavicular joint. Pain on motion in all planes. Positive scratch test. Positive Pelletier.     Pre-Existing Condition(s):     Assessment:   Initial Visit    Status: Additional Care Required  Permanent Disability:No    Plan: MedicationPT    Diagnostics: X-ray    Comments:       Disability  Information   Status: Released to Restricted Duty    From:  4/3/2018  Through: 4/26/2018 Restrictions are: Temporary   Physical Restrictions   Sitting:    Standing:    Stooping:    Bending:      Squatting:    Walking:    Climbing:    Pushing:      Pulling:    Other:    Reaching Above Shoulder (L): < or = to 1 hr/day Reaching Above Shoulder (R):       Reaching Below Shoulder (L):    Reaching Below Shoulder (R):      Not to exceed Weight Limits   Carrying(hrs):   Weight Limit(lb):   Lifting(hrs):   Weight  Limit(lb): < or = to 25 pounds   Comments:      Repetitive Actions   Hands: i.e. Fine Manipulations from Grasping:     Feet: i.e. Operating Foot Controls:     Driving / Operate Machinery:     Physician Name: Walt Mejía M.D. Physician Signature: WALT Tate M.D. e-Signature: Dr. Franky Shelby, Medical Director   Clinic Name / Location: [address],   [address] [place] [address] Clinic Phone Number: Dept: [phone]   Appointment Time: 8:00 Am Visit Start Time: 7:53 AM   Check-In Time:  7:50 Am Visit Discharge Time: 0845   Original-Treating Physician or Chiropractor    Page 2-Insurer/TPA    Page 3-Employer    Page 4-Employee

## 2018-04-03 NOTE — LETTER
[address],   [address] [address]  Phone:  [phone] - Fax:  [phone]   Occupational Health [facility] Progress Report and Disability Certification  Date of Service: 4/3/2018   No Show:  No  Date / Time of Next Visit: 4/26/2018   Claim Information   Patient Name: Isaias Fairchild  Claim Number:     Employer:   *** Date of Injury: 3/22/2018     Insurer / TPA:   *** ID / SSN:     Occupation: Unknown *** Diagnosis: The encounter diagnosis was Contusion of left shoulder, initial encounter.    Medical Information   Related to Industrial Injury?   Comments:Indeterminate pending further studies ***   Subjective Complaints:  Date of incident 3/22/2018. Incident-lifting 5 gallon bucket. 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 He reports an incident a few months ago where he apparently struck his left shoulder with a piece of equipment. He reported that but did not seek medical attention. About 2 weeks ago, he was lifting 5 gallon buckets and felt a pop in the left shoulder. He complains of anterior left shoulder pain which is worse with certain movements. No neck symptoms. No  past history of left shoulder pain. No numbness or weakness.   Objective Findings: Appearance: Well-developed, well-nourished.   Mental Status: Mood and Affect normal. Pleasant. Cooperative. Appropriate.   ENT: Oropharynx clear. Moist mucous membranes. Hearing normal.   Eyes: Pupils reactive. Conjunctiva normal. No scleral icterus.   Neck: Grossly normal good range of motion. No neck tenderness.  Cardiovascular: Normal rate. Regular rhythm. Normal heart sounds.   Chest: Effort normal. Breath sounds clear.   Musculoskeletal: Left shoulder has tenderness over the clavicle which appears near the acromioclavicular joint. Pain on motion in all planes. Positive scratch test. Positive Pellteier.     Pre-Existing Condition(s):     Assessment:   Initial Visit    Status: Additional  Care Required  Permanent Disability:No    Plan: MedicationPT    Diagnostics: X-ray    Comments:       Disability Information   Status: Released to Restricted Duty    From:  4/3/2018  Through: 4/26/2018 Restrictions are: Temporary   Physical Restrictions   Sitting:    Standing:    Stooping:    Bending:      Squatting:    Walking:    Climbing:    Pushing:      Pulling:    Other:    Reaching Above Shoulder (L): < or = to 1 hr/day Reaching Above Shoulder (R):       Reaching Below Shoulder (L):    Reaching Below Shoulder (R):      Not to exceed Weight Limits   Carrying(hrs):   Weight Limit(lb):   Lifting(hrs):   Weight  Limit(lb): < or = to 25 pounds   Comments:      Repetitive Actions   Hands: i.e. Fine Manipulations from Grasping:     Feet: i.e. Operating Foot Controls:     Driving / Operate Machinery:     Physician Name: Walt Mejía M.D. Physician Signature: WALT Tate M.D. e-Signature: Dr. Franky Shelby, Medical Director   Clinic Name / Location: [address],   [address] [address] Clinic Phone Number: Dept: [phone]   Appointment Time: 8:00 Am Visit Start Time: 7:53 AM   Check-In Time:  7:50 Am Visit Discharge Time:  ***   Original-Treating Physician or Chiropractor    Page 2-Insurer/TPA    Page 3-Employer    Page 4-Employee

## 2018-04-03 NOTE — PROGRESS NOTES
Subjective:      Isaias Fairchild is a 67 y.o. male who presents with Other (WC new DOI 3/22/18 (L) shoulder )      Date of incident 3/22/2018. Incident-lifting 5 gallon bucket. 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 He reports an incident a few months ago where he apparently struck his left shoulder with a piece of equipment. He reported that but did not seek medical attention. About 2 weeks ago, he was lifting 5 gallon buckets and felt a pop in the left shoulder. He complains of anterior left shoulder pain which is worse with certain movements. No neck symptoms. No  past history of left shoulder pain. No numbness or weakness.     HPI    ROS  Comprehensive medical history form reviewed. Pertinent positives and negatives included in HPI.    PFSH: reviewed in Epic    PMH:  has a past medical history of Thyroid disease.  MEDS:   Current Outpatient Prescriptions:   •  predniSONE (DELTASONE) 20 MG Tab, Take 1 Tab by mouth 2 times a day., Disp: 14 Tab, Rfl: 0  •  diclofenac EC (VOLTAREN) 75 MG Tablet Delayed Response, Take 1 Tab by mouth 2 times a day., Disp: 60 Tab, Rfl: 1  •  levothyroxine (SYNTHROID) 50 MCG Tab, Take 1 Tab by mouth Every morning on an empty stomach., Disp: 30 Tab, Rfl: 3  •  vitamin D (CHOLECALCIFEROL) 1000 UNIT Tab, Take 1,000 Units by mouth every day., Disp: , Rfl:   •  levothyroxine (SYNTHROID) 25 MCG Tab, Take 1 Tab by mouth Every morning on an empty stomach., Disp: 90 Tab, Rfl: 0  ALLERGIES: No Known Allergies  SURGHX: History reviewed. No pertinent surgical history.  SOCHX:  reports that he quit smoking about 2 years ago. He has a 20.00 pack-year smoking history. He has never used smokeless tobacco. He reports that he drinks about 8.4 oz of alcohol per week . He reports that he does not use drugs.  Work Status: Works in Grade control with [employer]  FH: No pertinent hereditary disorders.        Objective:     /75   Pulse 60   Temp 36.7 °C (98.1 °F)   Resp 16    "Ht 1.778 m (5' 10\")   Wt 78 kg (172 lb)   SpO2 94%   BMI 24.68 kg/m²      Physical Exam    Appearance: Well-developed, well-nourished.   Mental Status: Mood and Affect normal. Pleasant. Cooperative. Appropriate. .   Neck: Grossly normal good range of motion. No neck tenderness.  Cardiovascular: Normal rate. Regular rhythm. Normal heart sounds.   Chest: Effort normal. Breath sounds clear.   Musculoskeletal: Left shoulder has tenderness over the clavicle which appears near the acromioclavicular joint. Pain on motion in all planes. Positive scratch test. Positive Pelletier.       Assessment/Plan:     1. Contusion of left shoulder, initial encounter  New to Occupational Health   - DX-SHOULDER 2+ LEFT; Future  - predniSONE (DELTASONE) 20 MG Tab; Take 1 Tab by mouth 2 times a day.  Dispense: 14 Tab; Refill: 0  - diclofenac EC (VOLTAREN) 75 MG Tablet Delayed Response; Take 1 Tab by mouth 2 times a day.  Dispense: 60 Tab; Refill: 1  - REFERRAL TO PHYSICAL THERAPY Reason for Therapy: Eval/Treat/Report  - Restricted activity  - Recheck in 2-3 weeks when if not improved will consider MRI of left shoulder to exclude internal derangement or impingement.  - Anticipate orthopedic referral    This encounter was completed utilizing secure and encrypted video conferencing technology with the assistance of a train telehealth presenter      "

## 2018-04-16 ENCOUNTER — TELEPHONE (OUTPATIENT)
Dept: MEDICAL GROUP | Facility: PHYSICIAN GROUP | Age: 68
End: 2018-04-16

## 2018-04-16 DIAGNOSIS — M25.511 ACUTE PAIN OF RIGHT SHOULDER: ICD-10-CM

## 2018-04-17 ENCOUNTER — TELEPHONE (OUTPATIENT)
Dept: MEDICAL GROUP | Facility: CLINIC | Age: 68
End: 2018-04-17

## 2018-04-17 ENCOUNTER — OCCUPATIONAL MEDICINE (OUTPATIENT)
Dept: OCCUPATIONAL MEDICINE | Facility: CLINIC | Age: 68
End: 2018-04-17
Payer: COMMERCIAL

## 2018-04-17 ENCOUNTER — NON-PROVIDER VISIT (OUTPATIENT)
Dept: MEDICAL GROUP | Facility: CLINIC | Age: 68
End: 2018-04-17
Payer: COMMERCIAL

## 2018-04-17 VITALS
BODY MASS INDEX: 24.62 KG/M2 | OXYGEN SATURATION: 97 % | DIASTOLIC BLOOD PRESSURE: 76 MMHG | HEIGHT: 70 IN | RESPIRATION RATE: 16 BRPM | TEMPERATURE: 97.4 F | HEART RATE: 65 BPM | SYSTOLIC BLOOD PRESSURE: 133 MMHG | WEIGHT: 172 LBS

## 2018-04-17 DIAGNOSIS — N28.9 FUNCTION KIDNEY DECREASED: ICD-10-CM

## 2018-04-17 DIAGNOSIS — S40.012D CONTUSION OF LEFT SHOULDER, SUBSEQUENT ENCOUNTER: ICD-10-CM

## 2018-04-17 DIAGNOSIS — N39.46 MIXED STRESS AND URGE URINARY INCONTINENCE: ICD-10-CM

## 2018-04-17 PROBLEM — S40.012A CONTUSION OF LEFT SHOULDER: Status: ACTIVE | Noted: 2018-04-17

## 2018-04-17 PROCEDURE — 99213 OFFICE O/P EST LOW 20 MIN: CPT | Performed by: PREVENTIVE MEDICINE

## 2018-04-17 ASSESSMENT — PAIN SCALES - GENERAL: PAINLEVEL: 7=MODERATE-SEVERE PAIN

## 2018-04-17 NOTE — LETTER
[address],   [address] [address]  Phone:  [phone] - Fax:  [phone]   Occupational Health [facility] Progress Report and Disability Certification  Date of Service: 4/17/2018   No Show:  No  Date / Time of Next Visit: 5/17/2018   Claim Information   Patient Name: Isaias Fairchild  Claim Number:     Employer:   *** Date of Injury: 3/22/2018     Insurer / TPA: [employer] *** ID / SSN:     Occupation: Grade [employer] *** Diagnosis: The encounter diagnosis was Contusion of left shoulder, subsequent encounter.    Medical Information   Related to Industrial Injury?   Comments:Indeterminate ***   Subjective Complaints:  Date of incident 3/22/2018. Incident-lifting 5 gallon bucket. 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 He reports an incident a few months ago where he apparently struck his left shoulder with a piece of equipment. He indicates he is no better. He continues to have 7/10 aching dull pain.   Objective Findings: Left shoulder shows pain on motion mild to moderate.  Left shoulder x-ray shows mild osteoarthritis acromioclavicular joint.   Pre-Existing Condition(s):     Assessment:   Condition Same    Status: Discharged / Care Transfer  Permanent Disability:No    Plan: DiagnosticsConsultation    Diagnostics: MRI    Comments:  Orthopedic consultation requested    Disability Information   Status: Released to Restricted Duty    From:  4/17/2018  Through: 5/17/2018 Restrictions are: Temporary   Physical Restrictions   Sitting:    Standing:    Stooping:    Bending:      Squatting:    Walking:    Climbing:    Pushing:      Pulling:    Other:    Reaching Above Shoulder (L): < or = to 1 hr/day Reaching Above Shoulder (R):       Reaching Below Shoulder (L):    Reaching Below Shoulder (R):      Not to exceed Weight Limits   Carrying(hrs):   Weight Limit(lb):   Lifting(hrs):   Weight  Limit(lb): < or = to 25 pounds   Comments:      Repetitive  Actions   Hands: i.e. Fine Manipulations from Grasping:     Feet: i.e. Operating Foot Controls:     Driving / Operate Machinery:     Physician Name: Walt Mejía M.D. Physician Signature: WALT Tate M.D. e-Signature: Dr. Franky Shelby, Medical Director   Clinic Name / Location: [address],   [address]  [place] [address] Clinic Phone Number: Dept: [phone]   Appointment Time: 8:00 Am Visit Start Time: 7:55 AM   Check-In Time:  7:53 Am Visit Discharge Time:  ***   Original-Treating Physician or Chiropractor    Page 2-Insurer/TPA    Page 3-Employer    Page 4-Employee

## 2018-04-17 NOTE — LETTER
[address],   [address] [place] [address]  Phone:  [phone] - Fax:  [phone]   [facility] Progress Report and Disability Certification  Date of Service: 4/17/2018   No Show:  No  Date / Time of Next Visit: Transfer to Orthopedics    Claim Information   Patient Name: Isaias Fairchild  Claim Number:     Employer:   [employer] Date of Injury: 3/22/2018     Insurer / TPA: [employer]  ID / SSN:     Occupation: Grade Control  Diagnosis: The encounter diagnosis was Contusion of left shoulder, subsequent encounter.    Medical Information   Related to Industrial Injury?   Comments:Indeterminate    Subjective Complaints:  Date of incident 3/22/2018. Incident-lifting 5 gallon bucket. 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 He reports an incident a few months ago where he apparently struck his left shoulder with a piece of equipment. He indicates he is no better. He continues to have 7/10 aching dull pain.   Objective Findings: Left shoulder shows pain on motion mild to moderate.  Left shoulder x-ray shows mild osteoarthritis acromioclavicular joint.   Pre-Existing Condition(s):     Assessment:   Condition Same    Status: Discharged / Care Transfer  Permanent Disability:No    Plan: DiagnosticsConsultation    Diagnostics: MRI    Comments:  Orthopedic consultation requested    Disability Information   Status: Released to Restricted Duty    From:  4/17/2018  Through: 5/17/2018 Restrictions are: Temporary   Physical Restrictions   Sitting:    Standing:    Stooping:    Bending:      Squatting:    Walking:    Climbing:    Pushing:      Pulling:    Other:    Reaching Above Shoulder (L): < or = to 1 hr/day Reaching Above Shoulder (R):       Reaching Below Shoulder (L):    Reaching Below Shoulder (R):      Not to exceed Weight Limits   Carrying(hrs):   Weight Limit(lb):   Lifting(hrs):   Weight  Limit(lb): < or = to 25 pounds      Comments:      Repetitive Actions   Hands: i.e. Fine Manipulations from Grasping:     Feet: i.e. Operating Foot Controls:     Driving / Operate Machinery:     Physician Name: Walt Mejía M.D. Physician Signature: WALT Tate M.D. e-Signature: Dr. Franky Shelby, Medical Director   Clinic Name / Location: [address],   [address]  [place] [address] Clinic Phone Number: Dept: [phone]   Appointment Time: 8:00 Am Visit Start Time: 7:55 AM   Check-In Time:  7:53 Am Visit Discharge Time:  0825   Original-Treating Physician or Chiropractor    Page 2-Insurer/TPA    Page 3-Employer    Page 4-Employee

## 2018-04-17 NOTE — PROGRESS NOTES
"Subjective:      Isaias Fairchild is a 67 y.o. male who presents with Follow-Up (WC DOI 3/22/18 L Shoulder)      Date of incident 3/22/2018. Incident-lifting 5 gallon bucket. 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 He reports an incident a few months ago where he apparently struck his left shoulder with a piece of equipment. He indicates he is no better. He continues to have 7/10 aching dull pain.     HPI    ROS  PFSH:  WORK STATUS: Not working-no work available  PMH:  has a past medical history of Thyroid disease.  MEDS:   Current Outpatient Prescriptions:   •  diclofenac EC (VOLTAREN) 75 MG Tablet Delayed Response, Take 1 Tab by mouth 2 times a day., Disp: 60 Tab, Rfl: 1  •  levothyroxine (SYNTHROID) 50 MCG Tab, Take 1 Tab by mouth Every morning on an empty stomach., Disp: 30 Tab, Rfl: 3  •  vitamin D (CHOLECALCIFEROL) 1000 UNIT Tab, Take 1,000 Units by mouth every day., Disp: , Rfl:   •  levothyroxine (SYNTHROID) 25 MCG Tab, Take 1 Tab by mouth Every morning on an empty stomach., Disp: 90 Tab, Rfl: 0       Objective:     /76   Pulse 65   Temp 36.3 °C (97.4 °F)   Resp 16   Ht 1.778 m (5' 10\")   Wt 78 kg (172 lb)   SpO2 97%   BMI 24.68 kg/m²      Physical Exam    Left shoulder shows pain on motion mild to moderate.  Left shoulder x-ray shows mild osteoarthritis acromioclavicular joint.       Assessment/Plan:     1. Contusion of left shoulder, subsequent encounter  No significant change  - MR-SHOULDER-W/O LEFT; Future  - REFERRAL TO RADIOLOGY  - REFERRAL TO ORTHOPEDICS    This encounter was completed utilizing secure and encrypted video conferencing technology with the assistance of a train telehealth presenter.  "

## 2018-04-17 NOTE — LETTER
[address],   [address] [place] [address]  Phone:  [phone] - Fax:  [phone]   [facility] Progress Report and Disability Certification  Date of Service: 4/17/2018   No Show:  No  Date / Time of Next Visit: 5/17/2018   Claim Information   Patient Name: Isaias Fairchild  Claim Number:     Employer:   [employer] Date of Injury: 3/22/2018     Insurer / TPA: [employer] ID / SSN:     Occupation: Grade Control  Diagnosis: The encounter diagnosis was Contusion of left shoulder, subsequent encounter.    Medical Information   Related to Industrial Injury?   Comments:Indeterminate    Subjective Complaints:  Date of incident 3/22/2018. Incident-lifting 5 gallon bucket. 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 He reports an incident a few months ago where he apparently struck his left shoulder with a piece of equipment. He indicates he is no better. He continues to have 7/10 aching dull pain.   Objective Findings: Left shoulder shows pain on motion mild to moderate.  Left shoulder x-ray shows mild osteoarthritis acromioclavicular joint.   Pre-Existing Condition(s):     Assessment:   Condition Same    Status: Discharged / Care Transfer  Permanent Disability:No    Plan: DiagnosticsConsultation    Diagnostics: MRI    Comments:  Orthopedic consultation requested    Disability Information   Status: Released to Restricted Duty    From:  4/17/2018  Through: 5/17/2018 Restrictions are: Temporary   Physical Restrictions   Sitting:    Standing:    Stooping:    Bending:      Squatting:    Walking:    Climbing:    Pushing:      Pulling:    Other:    Reaching Above Shoulder (L): < or = to 1 hr/day Reaching Above Shoulder (R):       Reaching Below Shoulder (L):    Reaching Below Shoulder (R):      Not to exceed Weight Limits   Carrying(hrs):   Weight Limit(lb):   Lifting(hrs):   Weight  Limit(lb): < or = to 25 pounds   Comments:         Repetitive Actions   Hands: i.e. Fine Manipulations from Grasping:     Feet: i.e. Operating Foot Controls:     Driving / Operate Machinery:     Physician Name: Walt Mejía M.D. Physician Signature: WALT Tate M.D. e-Signature: Dr. Franky Shelby, Medical Director   Clinic Name / Location: [address],   [address]  [place] [address] Clinic Phone Number: Dept: [phone]   Appointment Time: 8:00 Am Visit Start Time: 7:55 AM   Check-In Time:  7:53 Am Visit Discharge Time:  0825   Original-Treating Physician or Chiropractor    Page 2-Insurer/TPA    Page 3-Employer    Page 4-Employee

## 2018-04-23 ENCOUNTER — NON-PROVIDER VISIT (OUTPATIENT)
Dept: MEDICAL GROUP | Facility: CLINIC | Age: 68
End: 2018-04-23
Payer: MEDICARE

## 2018-04-23 DIAGNOSIS — R73.09 ELEVATED RANDOM BLOOD GLUCOSE LEVEL: ICD-10-CM

## 2018-04-23 PROCEDURE — 36415 COLL VENOUS BLD VENIPUNCTURE: CPT | Performed by: NURSE PRACTITIONER

## 2018-04-26 ENCOUNTER — TELEPHONE (OUTPATIENT)
Dept: MEDICAL GROUP | Facility: PHYSICIAN GROUP | Age: 68
End: 2018-04-26

## 2018-04-26 DIAGNOSIS — E03.8 OTHER SPECIFIED HYPOTHYROIDISM: ICD-10-CM

## 2018-04-26 DIAGNOSIS — E55.9 VITAMIN D INSUFFICIENCY: ICD-10-CM

## 2018-04-26 NOTE — TELEPHONE ENCOUNTER
Last labs reviewed.  Was hoping to have THYROID and Vitamin D. Levels.  If he can come in for labs that would be great.  His kidney function is down a bit.  Please encourage water.  Tena Lau

## 2018-04-30 ENCOUNTER — NON-PROVIDER VISIT (OUTPATIENT)
Dept: MEDICAL GROUP | Facility: CLINIC | Age: 68
End: 2018-04-30
Payer: MEDICARE

## 2018-04-30 DIAGNOSIS — E03.8 OTHER SPECIFIED HYPOTHYROIDISM: ICD-10-CM

## 2018-04-30 PROCEDURE — 36415 COLL VENOUS BLD VENIPUNCTURE: CPT | Performed by: NURSE PRACTITIONER

## 2018-05-04 ENCOUNTER — TELEPHONE (OUTPATIENT)
Dept: MEDICAL GROUP | Facility: PHYSICIAN GROUP | Age: 68
End: 2018-05-04

## 2018-05-04 DIAGNOSIS — E55.9 VITAMIN D INSUFFICIENCY: ICD-10-CM

## 2018-05-04 NOTE — TELEPHONE ENCOUNTER
Normal thyroid and vitamin D level. Patient saw urologist planning a TURP. Please inform patient of normal labs. Tena Lua

## 2018-05-07 ENCOUNTER — TELEPHONE (OUTPATIENT)
Dept: MEDICAL GROUP | Facility: PHYSICIAN GROUP | Age: 68
End: 2018-05-07

## 2018-05-07 DIAGNOSIS — E03.9 HYPOTHYROIDISM, UNSPECIFIED TYPE: ICD-10-CM

## 2018-05-07 DIAGNOSIS — E03.8 OTHER SPECIFIED HYPOTHYROIDISM: ICD-10-CM

## 2018-05-07 NOTE — TELEPHONE ENCOUNTER
TSH is abnormal. I reported incorrectly as normal.   Please call patient to confirm his dose of thyroid medication and consistency.  If consistent he will need to increase to 75 mcg daily.  Let me know.  Tena Lau

## 2018-05-08 NOTE — TELEPHONE ENCOUNTER
Patient confirmed he is taking 50 mcg at the same time every day on an empty stomach. I told the patient you would be increasing his dose to 75 mcg. He states he still has 25 mcg tablets, and will start taking one 25 mcg tablet & one 50 mcg tablet until prescription goes through.

## 2018-05-09 RX ORDER — LEVOTHYROXINE SODIUM 0.07 MG/1
75 TABLET ORAL
Qty: 30 TAB | Refills: 3 | Status: SHIPPED | OUTPATIENT
Start: 2018-05-09 | End: 2018-09-24 | Stop reason: SDUPTHER

## 2018-05-29 ENCOUNTER — OFFICE VISIT (OUTPATIENT)
Dept: MEDICAL GROUP | Facility: CLINIC | Age: 68
End: 2018-05-29
Payer: MEDICARE

## 2018-05-29 VITALS
OXYGEN SATURATION: 97 % | SYSTOLIC BLOOD PRESSURE: 107 MMHG | RESPIRATION RATE: 16 BRPM | WEIGHT: 172 LBS | TEMPERATURE: 98.1 F | HEIGHT: 70 IN | DIASTOLIC BLOOD PRESSURE: 69 MMHG | BODY MASS INDEX: 24.62 KG/M2 | HEART RATE: 80 BPM

## 2018-05-29 DIAGNOSIS — Z90.79 S/P TURP: ICD-10-CM

## 2018-05-29 PROCEDURE — 99213 OFFICE O/P EST LOW 20 MIN: CPT | Performed by: NURSE PRACTITIONER

## 2018-05-29 RX ORDER — SULFAMETHOXAZOLE AND TRIMETHOPRIM 800; 160 MG/1; MG/1
TABLET ORAL
Qty: 20 TAB | Refills: 0 | Status: SHIPPED | OUTPATIENT
Start: 2018-05-29

## 2018-05-29 RX ORDER — CIPROFLOXACIN 250 MG/1
250 TABLET, FILM COATED ORAL 2 TIMES DAILY
Qty: 20 TAB | Refills: 0 | Status: SHIPPED | OUTPATIENT
Start: 2018-05-29

## 2018-05-29 NOTE — PROGRESS NOTES
Chief Complaint   Patient presents with   • Groin Swelling       HISTORY OF PRESENT ILLNESS: Patient is a @ age 67 here  today to discuss:     Interval history:     Hospitalizations  : yes TURP     Injuries  No     Illness  No         S/P TURP  This is a 67 year old male who recently had TURP about 7-8 days ago. He had scar tissue removed. He was recovering well, until yesterday when he had swelling and redness to the left testicle. No fever. No chills. He is self catheterizing. No trauma to area.   Patient called surgeon. Patient was told to be seen at urgent care. Unfortunately, this office dose not have imaging for ultrasound.   I called his surgeon's office and spoke to his Medical Assistant/Medic as the surgeon was not available.  Patient should be seen at ER for evaluation and he could have infection/hydrocele.     Patient has run out of antibiotics. Taking Ibuprofen for pain.     Reviewed situation with patient. He will be going by private car to [place] tomorrow to be evaluated.         Allergies:Patient has no known allergies.    Current Outpatient Prescriptions Ordered in [facility]   Medication Sig Dispense Refill   • sulfamethoxazole-trimethoprim (BACTRIM DS) 800-160 MG tablet Take one pill twice daily for ten days 20 Tab 0   • levothyroxine (SYNTHROID) 75 MCG Tab Take 1 Tab by mouth Every morning on an empty stomach. 30 Tab 3   • diclofenac EC (VOLTAREN) 75 MG Tablet Delayed Response Take 1 Tab by mouth 2 times a day. 60 Tab 1   • vitamin D (CHOLECALCIFEROL) 1000 UNIT Tab Take 1,000 Units by mouth every day.       No current [facility]-ordered facility-administered medications on file.        Past Medical History:   Diagnosis Date   • Thyroid disease        Social History   Substance Use Topics   • Smoking status: Former Smoker     Packs/day: 0.50     Years: 40.00     Quit date: 10/3/2015   • Smokeless tobacco: Never Used   • Alcohol use 8.4 oz/week     14 Cans of beer per week       Family Status   Relation Status  "  • Mother    • Father      Family History   Problem Relation Age of Onset   • Heart Disease Mother        ROS: As documented in my HPI      Exam:  Blood pressure 107/69, pulse 80, temperature 36.7 °C (98.1 °F), resp. rate 16, height 1.778 m (5' 10\"), weight 78 kg (172 lb), SpO2 97 %.  General:  Well nourished, well developed male in NAD  Head: Nontender scalp. No lesions  Neck: Supple. Symmetric Thyroid palpated. No bruits  Pulmonary:  Normal effort. No rales, ronchi, or wheezing.  Cardiovascular: Regular rate and rhythm without murmur.   Abdomen: Soft nontender, normal bowel sounds  Genital: Right testicle normal size and shape. Left swollen, red and tender  Extremities: no clubbing, cyanosis, or edema.  Psych: Alert and oriented x3.    Neurological: No focal deficits    Please note that this dictation was created using voice recognition software. I have made every reasonable attempt to correct obvious errors, but I expect that there are errors of grammar and possibly content that I did not discover before finalizing the note.    Assessment/Plan:  1. S/P TURP  sulfamethoxazole-trimethoprim (BACTRIM DS) 800-160 MG tablet   Possible complication of surgery. Infection versus hydrocele  Patient to go to ER by private car.          "

## 2018-06-05 ENCOUNTER — TELEPHONE (OUTPATIENT)
Dept: MEDICAL GROUP | Facility: PHYSICIAN GROUP | Age: 68
End: 2018-06-05

## 2018-09-24 DIAGNOSIS — E03.9 HYPOTHYROIDISM, UNSPECIFIED TYPE: ICD-10-CM

## 2018-09-24 RX ORDER — LEVOTHYROXINE SODIUM 0.07 MG/1
75 TABLET ORAL
Qty: 30 TAB | Refills: 1 | Status: SHIPPED | OUTPATIENT
Start: 2018-09-24 | End: 2018-11-20 | Stop reason: SDUPTHER

## 2018-09-24 NOTE — LETTER
September 24, 2018        Isaias Fairchild  [address]  [facility] [address]        Dear Isaias:    Make sure you update your labs for thyroid. The order is there for labs.  I refilled your thyroid medication for two months.     If you have any questions or concerns, please don't hesitate to call.        Sincerely,        JUSTICE Evans.    Electronically Signed

## 2018-09-24 NOTE — TELEPHONE ENCOUNTER
Was the patient seen in the last year in this department? Yes    Does patient have an active prescription for medications requested? Yes    Received Request Via: Pharmacy     Requested Prescriptions     Pending Prescriptions Disp Refills   • levothyroxine (SYNTHROID) 75 MCG Tab 30 Tab      Sig: Take 1 Tab by mouth Every morning on an empty stomach.

## 2018-09-25 ENCOUNTER — NON-PROVIDER VISIT (OUTPATIENT)
Dept: MEDICAL GROUP | Facility: CLINIC | Age: 68
End: 2018-09-25
Payer: MEDICARE

## 2018-09-25 DIAGNOSIS — N28.9 FUNCTION KIDNEY DECREASED: ICD-10-CM

## 2018-09-25 PROCEDURE — 36415 COLL VENOUS BLD VENIPUNCTURE: CPT | Performed by: NURSE PRACTITIONER

## 2018-09-25 NOTE — NON-PROVIDER
Isaias Fairchild is a 68 y.o. male here for a non-provider visit for Venipuncture    If abnormal was an in office provider notified upon receipt of results (if so, indicate provider)? Yes  Routed to PCP? Yes

## 2018-11-05 ENCOUNTER — NON-PROVIDER VISIT (OUTPATIENT)
Dept: MEDICAL GROUP | Facility: CLINIC | Age: 68
End: 2018-11-05
Payer: MEDICARE

## 2018-11-05 DIAGNOSIS — N28.9 FUNCTION KIDNEY DECREASED: ICD-10-CM

## 2018-11-05 PROCEDURE — 36415 COLL VENOUS BLD VENIPUNCTURE: CPT | Performed by: FAMILY MEDICINE

## 2018-11-05 NOTE — NON-PROVIDER
Isaias Fairchlid is a 68 y.o. male here for a non-provider visit for venipuncture.    If abnormal was an in office provider notified today (if so, indicate provider)? Yes  Routed to PCP? Yes

## 2018-11-20 RX ORDER — LEVOTHYROXINE SODIUM 0.07 MG/1
75 TABLET ORAL
Qty: 30 TAB | Refills: 1 | Status: SHIPPED | OUTPATIENT
Start: 2018-11-20 | End: 2019-01-22 | Stop reason: SDUPTHER

## 2018-11-20 NOTE — TELEPHONE ENCOUNTER
Was the patient seen in the last year in this department? Yes    Does patient have an active prescription for medications requested? Yes    Received Request Via: Pharmacy     Requested Prescriptions     Pending Prescriptions Disp Refills   • levothyroxine (SYNTHROID) 75 MCG Tab 30 Tab 1     Sig: Take 1 Tab by mouth Every morning on an empty stomach.

## 2019-01-22 DIAGNOSIS — E03.9 HYPOTHYROIDISM, UNSPECIFIED TYPE: ICD-10-CM

## 2019-01-22 RX ORDER — LEVOTHYROXINE SODIUM 0.07 MG/1
75 TABLET ORAL
Qty: 30 TAB | Refills: 0 | Status: SHIPPED | OUTPATIENT
Start: 2019-01-22 | End: 2019-02-22 | Stop reason: SDUPTHER

## 2019-02-21 DIAGNOSIS — E03.9 HYPOTHYROIDISM, UNSPECIFIED TYPE: ICD-10-CM

## 2019-02-21 RX ORDER — LEVOTHYROXINE SODIUM 0.07 MG/1
75 TABLET ORAL
Qty: 30 TAB | Refills: 0 | OUTPATIENT
Start: 2019-02-21

## 2019-02-21 NOTE — TELEPHONE ENCOUNTER
Was the patient seen in the last year in this department? Yes    Does patient have an active prescription for medications requested? Yes    Received Request Via: Pharmacy     Requested Prescriptions     Pending Prescriptions Disp Refills   • levothyroxine (SYNTHROID) 75 MCG Tab 30 Tab 0     Sig: Take 1 Tab by mouth Every morning on an empty stomach.

## 2019-02-22 ENCOUNTER — TELEMEDICINE2 (OUTPATIENT)
Dept: MEDICAL GROUP | Age: 69
End: 2019-02-22
Payer: MEDICARE

## 2019-02-22 VITALS
RESPIRATION RATE: 16 BRPM | SYSTOLIC BLOOD PRESSURE: 134 MMHG | HEART RATE: 70 BPM | HEIGHT: 70 IN | DIASTOLIC BLOOD PRESSURE: 84 MMHG | OXYGEN SATURATION: 97 % | BODY MASS INDEX: 24.34 KG/M2 | WEIGHT: 170 LBS | TEMPERATURE: 98.4 F

## 2019-02-22 DIAGNOSIS — N40.0 BENIGN PROSTATIC HYPERPLASIA WITHOUT LOWER URINARY TRACT SYMPTOMS: ICD-10-CM

## 2019-02-22 DIAGNOSIS — E55.9 VITAMIN D INSUFFICIENCY: ICD-10-CM

## 2019-02-22 DIAGNOSIS — E03.8 OTHER SPECIFIED HYPOTHYROIDISM: ICD-10-CM

## 2019-02-22 PROBLEM — N30.01 ACUTE CYSTITIS WITH HEMATURIA: Status: RESOLVED | Noted: 2017-10-03 | Resolved: 2019-02-22

## 2019-02-22 PROCEDURE — 99214 OFFICE O/P EST MOD 30 MIN: CPT | Performed by: INTERNAL MEDICINE

## 2019-02-22 RX ORDER — LEVOTHYROXINE SODIUM 0.07 MG/1
75 TABLET ORAL
Qty: 30 TAB | Refills: 8 | Status: SHIPPED | OUTPATIENT
Start: 2019-02-22 | End: 2019-12-10 | Stop reason: SDUPTHER

## 2019-02-22 NOTE — PROGRESS NOTES
CC: Medication refill, reestablish care    Verified identification with patient. Secured video conference with RN presenter in [facility]      HPI:   Isaias presents today with the following.  Previous patient of Tena Lau    1. Benign prostatic hyperplasia without lower urinary tract symptoms  Patient is currently being followed by urologist, Dr. Baumann and renal.  Status post TURP in November.  Patient is asymptomatic, doing well after the procedure.  No urinary symptoms.  Per patient, follow-up labs were within good range.  Follow-up appointment in the spring    2. Other specified hypothyroidism  Requesting refill on levothyroxine 75 MCG's daily.  TSH was 1.64 in September 2018.  Asymptomatic    3. Vitamin D insufficiency  Taking vitamin D3, 1000 units daily.      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     Priority: High   • Chronic bilateral low back pain without sciatica 10/03/2017     Priority: Low   • Benign prostatic hyperplasia without lower urinary tract symptoms 02/22/2019   • S/P TURP 05/29/2018   • Contusion of left shoulder 04/17/2018   • Acute pain of right shoulder 04/16/2018   • Mixed stress and urge urinary incontinence 01/09/2018   • Elevated random blood glucose level 12/13/2017   • Vitamin D insufficiency 10/12/2017   • Function kidney decreased 10/12/2017   • Other specified hypothyroidism 10/05/2017       Current Outpatient Prescriptions   Medication Sig Dispense Refill   • levothyroxine (SYNTHROID) 75 MCG Tab Take 1 Tab by mouth Every morning on an empty stomach. 30 Tab 8   • sulfamethoxazole-trimethoprim (BACTRIM DS) 800-160 MG tablet Take one pill twice daily for ten days 20 Tab 0   • ciprofloxacin (CIPRO) 250 MG Tab Take 1 Tab by mouth 2 times a day. 20 Tab 0   • diclofenac EC (VOLTAREN) 75 MG Tablet Delayed Response Take 1 Tab by mouth 2 times a day. 60 Tab 1   • vitamin D (CHOLECALCIFEROL) 1000 UNIT Tab Take 1,000 Units by mouth every day.    "    No current facility-administered medications for this visit.          Allergies as of 02/22/2019   • (No Known Allergies)        ROS: As per HPI.  Denies all cardiopulmonary, GI, neurologic symptoms.    /84 (BP Location: Right arm, Patient Position: Sitting)   Pulse 70   Temp 36.9 °C (98.4 °F)   Resp 16   Ht 1.778 m (5' 10\")   Wt 77.1 kg (170 lb)   SpO2 97%   BMI 24.39 kg/m²     Physical Exam:  Gen:         Alert and oriented, No apparent distress.  Neck:        No Lymphadenopathy or Bruits.  No thyromegaly.  No thyroid nodules noted.  Neck is supple  Lungs:     Clear to auscultation bilaterally, no wheezes rhonchi or crackles  CV:          Regular rate and rhythm. No murmurs, rubs or gallops.  Abd:         Soft non tender, non distended. Normal active bowel sounds.  No  Hepatosplenomegaly, No pulsatile masses.                   Ext:          No clubbing, cyanosis, edema.      Assessment and Plan.   68 y.o. male with the following issues.    1. Benign prostatic hyperplasia without lower urinary tract symptoms  Urology notes reviewed  Patient stable, keep appointment with urology in the spring  PSA levels within normal limits    2. Other specified hypothyroidism  Patient stable, continue levothyroxine 75 MCG's daily    - levothyroxine (SYNTHROID) 75 MCG Tab; Take 1 Tab by mouth Every morning on an empty stomach.  Dispense: 30 Tab; Refill: 8    3. Vitamin D insufficiency  Continue supplement of vitamin D3            Please note that this dictation was created using voice recognition software. I have made every reasonable attempt to correct obvious errors, but expect that there are errors of grammar and possible content that I did not discover before finalizing note.   "

## 2019-12-09 DIAGNOSIS — E03.8 OTHER SPECIFIED HYPOTHYROIDISM: ICD-10-CM

## 2019-12-09 NOTE — TELEPHONE ENCOUNTER
Was the patient seen in the last year in this department? Yes    Does patient have an active prescription for medications requested? No     Received Request Via: Pharmacy  Requested Prescriptions     Pending Prescriptions Disp Refills   • levothyroxine (SYNTHROID) 75 MCG Tab 30 Tab 8     Sig: Take 1 Tab by mouth Every morning on an empty stomach.

## 2019-12-10 RX ORDER — LEVOTHYROXINE SODIUM 0.07 MG/1
75 TABLET ORAL
Qty: 30 TAB | Refills: 3 | Status: SHIPPED | OUTPATIENT
Start: 2019-12-10